# Patient Record
Sex: FEMALE | Race: WHITE | Employment: FULL TIME | URBAN - METROPOLITAN AREA
[De-identification: names, ages, dates, MRNs, and addresses within clinical notes are randomized per-mention and may not be internally consistent; named-entity substitution may affect disease eponyms.]

---

## 2019-01-15 ENCOUNTER — OFFICE VISIT (OUTPATIENT)
Dept: FAMILY MEDICINE CLINIC | Age: 39
End: 2019-01-15

## 2019-01-15 VITALS
WEIGHT: 167 LBS | HEART RATE: 84 BPM | DIASTOLIC BLOOD PRESSURE: 88 MMHG | BODY MASS INDEX: 26.21 KG/M2 | SYSTOLIC BLOOD PRESSURE: 126 MMHG | RESPIRATION RATE: 16 BRPM | TEMPERATURE: 97.8 F | OXYGEN SATURATION: 98 % | HEIGHT: 67 IN

## 2019-01-15 DIAGNOSIS — L98.9 SKIN LESION: Primary | ICD-10-CM

## 2019-01-15 NOTE — PROGRESS NOTES
Chief Complaint Patient presents with [facility] New pateint  Skin Problem Left side of face scab, will like that checked out. 1. Have you been to the ER, urgent care clinic since your last visit? Hospitalized since your last visit? No 
 
2. Have you seen or consulted any other health care providers outside of the [address] [place] [address] since your last visit? Include any pap smears or colon screening.  No

## 2021-10-08 ENCOUNTER — VIRTUAL VISIT (OUTPATIENT)
Dept: INTERNAL MEDICINE CLINIC | Age: 41
End: 2021-10-08
Payer: COMMERCIAL

## 2021-10-08 DIAGNOSIS — Z00.00 ANNUAL PHYSICAL EXAM: ICD-10-CM

## 2021-10-08 DIAGNOSIS — R23.8 CHANGE OF SKIN COLOR: Primary | ICD-10-CM

## 2021-10-08 DIAGNOSIS — F17.200 SMOKING: ICD-10-CM

## 2021-10-08 PROCEDURE — 99203 OFFICE O/P NEW LOW 30 MIN: CPT | Performed by: PHYSICIAN ASSISTANT

## 2021-10-08 RX ORDER — VARENICLINE TARTRATE 1 MG/1
1 TABLET, FILM COATED ORAL 2 TIMES DAILY
Qty: 90 TABLET | Refills: 1 | Status: SHIPPED | OUTPATIENT
Start: 2021-10-08 | End: 2021-10-29

## 2021-10-08 RX ORDER — CHOLECALCIFEROL (VITAMIN D3) 125 MCG
CAPSULE ORAL
COMMUNITY
End: 2021-10-29

## 2021-10-08 NOTE — PROGRESS NOTES
Brennon Santa is a 37 y.o. female who was seen by synchronous (real-time) audio-video technology on 10/8/2021    Consent: Brennon Santa, who was seen by synchronous (real-time) audio-video technology, and/or her healthcare decision maker, is aware that this patient-initiated, Telehealth encounter on 10/8/2021 is a billable service, with coverage as determined by her insurance carrier. She is aware that she may receive a bill and has provided verbal consent to proceed: YES  Subjective:   Brennon Santa is a 37 y.o. female who was seen for [facility] 9 smoking with Chantix years ago, but recently restarted smoking. Would like to restart Chantix. Tried patches - not helping. Face with white patches x years - getting worse. Would like to see Derm. Difficulty sleeping - friend's Ambien has been helpful in the past, but would rather not take this. Nyquil and Benadryl 50 mg cause sleepiness the next day. Benadryl 25 mg is not quite adequate. Walk for exercise a few times/week for over 1 hour. Yoga/massage training. Feels that she is drinking alcohol too frequently and plans to cut back. Successfully did this with quitting smoking years ago. Health Maintenance Due   Topic Date Due    Hepatitis C Screening  Never done    COVID-19 Vaccine (1) Never done    Cervical cancer screen  Never done    Lipid Screen  Never done    Flu Vaccine (1) Never done       Review of Systems   Respiratory: Negative for shortness of breath. Cardiovascular: Negative for chest pain and palpitations. Neurological: Negative for dizziness, loss of consciousness and weakness.      Objective:     General: alert, cooperative, no distress   Mental  status: normal mood, behavior, speech, dress, motor activity, and thought processes, able to follow commands   HENT: NCAT   Neck: no visualized mass   Resp: no respiratory distress   Neuro: no gross deficits   Skin: no discoloration or lesions of concern on visible areas   Psychiatric: normal affect, consistent with stated mood, no evidence of hallucinations     Assessment & Plan:     Encounter Diagnoses     ICD-10-CM ICD-9-CM   1. Change of skin color  R23.8 782.9   2. Annual physical exam  Z00.00 V70.0   3. Smoking  F17.200 305.1     Orders Placed This Encounter    CBC W/O DIFF    METABOLIC PANEL, COMPREHENSIVE    TSH 3RD GENERATION    HEMOGLOBIN A1C WITH EAG    LIPID PANEL    HEPATITIS C AB    REFERRAL TO DERMATOLOGY    varenicline (CHANTIX) 1 mg tablet    melatonin 15 mg tablets nightly     Encouraged pt to increase exercise, decrease alcohol. We discussed the expected course, resolution and complications of the diagnosis(es) in detail. Medication risks, benefits, costs, interactions, and alternatives were discussed as indicated. I advised her to contact the office if her condition worsens, changes or fails to improve as anticipated. She expressed understanding with the diagnosis(es) and plan. Rajesh Velasquez is a 37 y.o. female who was evaluated by a video visit encounter for concerns as above. Patient identification was verified prior to start of the visit. A caregiver was present when appropriate. Due to this being a TeleHealth encounter (During [facility]- public health emergency), evaluation of the following organ systems was limited: Vitals/Constitutional/EENT/Resp/CV/GI//MS/Neuro/Skin/Heme-Lymph-Imm. Pursuant to the emergency declaration under the [facility], 1135 waiver authority and the [employer] General Act, this Virtual  Visit was conducted, with patient's (and/or legal guardian's) consent, to reduce the patient's risk of exposure to COVID-19 and provide necessary medical care. Services were provided through a video synchronous discussion virtually to substitute for in-person clinic visit.    Patient and provider were located at their individual [facility].       Song Paniagua PA-C

## 2021-10-08 NOTE — PROGRESS NOTES
1. Have you been to the ER, urgent care clinic since your last visit? Hospitalized since your last visit? No    2. Have you seen or consulted any other health care providers outside of the [address] [place] [address] since your last visit? Include any pap smears or colon screening.  No   Chief Complaint   Patient presents with   [facility]     Please send link [phone]

## 2021-10-20 ENCOUNTER — OFFICE VISIT (OUTPATIENT)
Dept: INTERNAL MEDICINE CLINIC | Age: 41
End: 2021-10-20
Payer: COMMERCIAL

## 2021-10-20 ENCOUNTER — HOSPITAL ENCOUNTER (OUTPATIENT)
Dept: GENERAL RADIOLOGY | Age: 41
Discharge: HOME | End: 2021-10-20
Attending: PHYSICIAN ASSISTANT
Payer: COMMERCIAL

## 2021-10-20 VITALS
BODY MASS INDEX: 25.3 KG/M2 | HEART RATE: 92 BPM | RESPIRATION RATE: 16 BRPM | TEMPERATURE: 98.3 F | WEIGHT: 161.2 LBS | OXYGEN SATURATION: 100 % | HEIGHT: 67 IN | SYSTOLIC BLOOD PRESSURE: 118 MMHG | DIASTOLIC BLOOD PRESSURE: 79 MMHG

## 2021-10-20 DIAGNOSIS — D64.9 ANEMIA, UNSPECIFIED TYPE: ICD-10-CM

## 2021-10-20 DIAGNOSIS — S99.911A INJURY OF RIGHT ANKLE, INITIAL ENCOUNTER: ICD-10-CM

## 2021-10-20 DIAGNOSIS — Z13.21 ENCOUNTER FOR VITAMIN DEFICIENCY SCREENING: ICD-10-CM

## 2021-10-20 DIAGNOSIS — S99.911A INJURY OF RIGHT ANKLE, INITIAL ENCOUNTER: Primary | ICD-10-CM

## 2021-10-20 PROCEDURE — 73630 X-RAY EXAM OF FOOT: CPT

## 2021-10-20 PROCEDURE — 99214 OFFICE O/P EST MOD 30 MIN: CPT | Performed by: PHYSICIAN ASSISTANT

## 2021-10-20 PROCEDURE — 73610 X-RAY EXAM OF ANKLE: CPT

## 2021-10-20 NOTE — PROGRESS NOTES
1. Have you been to the ER, urgent care clinic since your last visit? Hospitalized since your last visit? No    2. Have you seen or consulted any other health care providers outside of the [address] [place] [address] since your last visit? Include any pap smears or colon screening.  No   Chief Complaint   Patient presents with    Follow-up     labs    Ankle Pain     to right ankle pain is 4 out 10 when walking

## 2021-10-20 NOTE — PROGRESS NOTES
Hallie Chavez is a 37 y.o. female  Chief Complaint   Patient presents with    Follow-up     labs    Ankle Pain     to right ankle pain is 4 out 10 when walking     Visit Vitals  /79   Pulse 92   Temp 98.3 °F (36.8 °C) (Temporal)   Resp 16   Ht 5' 7\" (1.702 m)   Wt 161 lb 3.2 oz (73.1 kg)   SpO2 100%   BMI 25.25 kg/m²      Health Maintenance Due   Topic Date Due    Pneumococcal 0-64 years (1 of 2 - PPSV23) Never done    COVID-19 Vaccine (1) Never done    Cervical cancer screen  Never done    Flu Vaccine (1) Never done       HPI  R Ankle pain x several days after injury. Pain is slowly improving. Anemia x years. Now eating more meat. Not having heavy periods or losing blood through GI/ system. ROS  Review of Systems   Constitutional: Negative for fever. Respiratory: Negative for shortness of breath. Cardiovascular: Negative for chest pain and palpitations. Neurological: Negative for dizziness, loss of consciousness and weakness. Psychiatric/Behavioral: Negative for depression and substance abuse. EXAM  Physical Exam  Vitals and nursing note reviewed. Constitutional:       General: She is not in acute distress. Appearance: She is well-developed. HENT:      Head: Normocephalic and atraumatic. Neck:      Vascular: No JVD. Cardiovascular:      Rate and Rhythm: Normal rate and regular rhythm. Heart sounds: Normal heart sounds. Pulmonary:      Effort: Pulmonary effort is normal. No respiratory distress. Breath sounds: Normal breath sounds. Musculoskeletal:         General: Normal range of motion. Cervical back: Neck supple. Comments: R lateral ankle slightly tender to palp, swollen, and bruised. Bruise extends through toes. Skin:     General: Skin is warm and dry. Neurological:      Mental Status: She is alert and oriented to person, place, and time.    Psychiatric:         Mood and Affect: Mood normal.         Behavior: Behavior normal. Thought Content: Thought content normal.         Judgment: Judgment normal.       ASSESSMENT/PLAN  Encounter Diagnoses     ICD-10-CM ICD-9-CM   1. Injury of right ankle, initial encounter  S99.911A 959.7   2. Anemia, unspecified type  D64.9 285.9   3.  Encounter for vitamin deficiency screening  Z13.21 V77.99     Orders Placed This Encounter    XR ANKLE RT MIN 3 V    XR FOOT RT MIN 3 V    CELIAC ANTIBODY PROFILE    IRON PROFILE    VITAMIN B12 & FOLATE    VITAMIN D, 25 HYDROXY    REFERRAL TO GASTROENTEROLOGY

## 2021-10-25 ENCOUNTER — PATIENT MESSAGE (OUTPATIENT)
Dept: INTERNAL MEDICINE CLINIC | Age: 41
End: 2021-10-25

## 2021-10-25 DIAGNOSIS — E55.9 VITAMIN D DEFICIENCY: ICD-10-CM

## 2021-10-25 DIAGNOSIS — K90.0 CELIAC DISEASE: ICD-10-CM

## 2021-10-25 DIAGNOSIS — D50.9 IRON DEFICIENCY ANEMIA, UNSPECIFIED IRON DEFICIENCY ANEMIA TYPE: Primary | ICD-10-CM

## 2021-10-25 RX ORDER — ACETAMINOPHEN 500 MG
2000 TABLET ORAL DAILY
COMMUNITY
Start: 2021-10-25 | End: 2022-01-21 | Stop reason: DRUGHIGH

## 2021-10-25 NOTE — TELEPHONE ENCOUNTER
----- Message from Chandler Valencia PA-C sent at 10/25/2021 11:58 AM EDT -----  Regarding: FW: Test Results Question  Contact: [phone]  Ok to schedule VV   ----- Message -----  From: Sagrario Donato LPN  Sent: 00/22/6322  10:21 AM EDT  To: Chandler Valencia PA-C  Subject: FW: Test Results Question                          ----- Message -----  From: Dean Guzmán  Sent: 10/25/2021  10:11 AM EDT  To: Claribel Cook Pool  Subject: Test Results Question                            Good morning Bernadette,     Should I schedule a virtual visit with you to discuss the celiac serology results? Some initial thoughts:   1. Im not sure I want to pursue GI appt/ scheduling an endoscopy bc I dont want to keep eating gluten for the likely months long period it will take to schedule endoscopy. I may see how gluten free affects me and go from there. I have had digestive issues and fatigue since I was a child and dont want to prolong feeling like crap if unnecessary. 2. I may want dietician referral at this point bc this is new territory for me. 3. A bone density scan may be helpful at this time bc I have broken 2 bones in last 5 months. Thank you.

## 2021-12-08 ENCOUNTER — OFFICE VISIT (OUTPATIENT)
Dept: ORTHOPEDIC SURGERY | Age: 41
End: 2021-12-08
Payer: COMMERCIAL

## 2021-12-08 VITALS — BODY MASS INDEX: 26.66 KG/M2 | HEIGHT: 65 IN | WEIGHT: 160 LBS

## 2021-12-08 DIAGNOSIS — S82.61XD CLOSED AVULSION FRACTURE OF LATERAL MALLEOLUS OF RIGHT FIBULA WITH ROUTINE HEALING, SUBSEQUENT ENCOUNTER: ICD-10-CM

## 2021-12-08 DIAGNOSIS — S92.351D CLOSED DISPLACED FRACTURE OF FIFTH METATARSAL BONE OF RIGHT FOOT WITH ROUTINE HEALING, SUBSEQUENT ENCOUNTER: Primary | ICD-10-CM

## 2021-12-08 PROCEDURE — 99212 OFFICE O/P EST SF 10 MIN: CPT | Performed by: ORTHOPAEDIC SURGERY

## 2021-12-08 NOTE — PROGRESS NOTES
Karen Bonner (: 1980) is a 37 y.o. female, patient,here for evaluation of the following   Chief Complaint   Patient presents with   [address] is in today for a recheck of her right lateral malleolus and right fifth metatarsal base fractures. She is 6 weeks post her last visit. She denies pain today. ASSESSMENT/PLAN:  Below is the assessment and plan developed based on review of pertinent history, physical exam, labs, studies, and medications. 1. Closed displaced fracture of fifth metatarsal bone of right foot with routine healing, subsequent encounter  -     XR FOOT RT MIN 3 V; Future  -     XR ANKLE RT MIN 3 V; Future  -     REFERRAL TO PHYSICAL THERAPY  2. Closed avulsion fracture of lateral malleolus of right fibula with routine healing, subsequent encounter  -     XR FOOT RT MIN 3 V; Future  -     XR ANKLE RT MIN 3 V; Future  -     REFERRAL TO PHYSICAL THERAPY      Overall patient is improved since last seen at right foot and ankle. The right ankle is fully recovered she has no pain there. The right foot still has some symptoms. I did recommend continued use of a rigid soled shoes, she is wearing regular shoes to have more flexibility so therefore may be taking longer to heal.  I did recommend using a semirigid arch support to help. Referral for physical therapy made for regaining normal strength, balance and gait. If she returns because symptoms do not improve at the right foot, will get new x-rays right foot 3 views weightbearing, otherwise the fracture is healing and should have no further problems complete healing. ,Return if symptoms worsen or fail to improve. Allergies   Allergen Reactions    Gluten Other (comments)       Current Outpatient Medications   Medication Sig    MAGNESIUM PO Take  by mouth.  ZINC PO Take  by mouth.  CYANOCOBALAMIN, VITAMIN B-12, PO Take  by mouth.     ALPRAZolam (XANAX) 0.25 mg tablet Take 1 Tablet by mouth nightly as needed for Sleep. Max Daily Amount: 0.25 mg.    ferrous sulfate (SLOW FE) 142 mg (45 mg iron) ER tablet Take  by mouth Daily (before breakfast).  cholecalciferol (VITAMIN D3) (2,000 UNITS /50 MCG) cap capsule Take 1 Capsule by mouth daily. No current facility-administered medications for this visit. No past medical history on file. Past Surgical History:   Procedure Laterality Date    HX HEENT      Both eyes       Family History   Problem Relation Age of Onset   [employer] Cancer Mother         Lung cancer  in        Social History     Socioeconomic History    Marital status: SINGLE     Spouse name: Not on file    Number of children: Not on file    Years of education: Not on file    Highest education level: Not on file   Occupational History    Occupation:  [employer]   Tobacco Use    Smoking status: Current Every Day Smoker     Packs/day: 1.00     Years: 13.00     Pack years: 13.00    Smokeless tobacco: Never Used   Vaping Use    Vaping Use: Former    Substances: Nicotine    Devices: Refillable tank   Substance and Sexual Activity    Alcohol use: Yes     Alcohol/week: 20.0 standard drinks     Types: 20 Standard drinks or equivalent per week    Drug use: No    Sexual activity: Yes     Partners: Male     Birth control/protection: None   Other Topics Concern    Not on file   Social History Narrative    Not on file     Social Determinants of Health     Financial Resource Strain:     Difficulty of Paying Living Expenses: Not on file   Food Insecurity:     Worried About Running Out of Food in the Last Year: Not on file    Kapil of Food in the Last Year: Not on file   Transportation Needs:     Lack of Transportation (Medical): Not on file    Lack of Transportation (Non-Medical):  Not on file   Physical Activity:     Days of Exercise per Week: Not on file    Minutes of Exercise per Session: Not on file   Stress:     Feeling of Stress : Not on file   Social Connections:     Frequency of Communication with Friends and Family: Not on file    Frequency of Social Gatherings with Friends and Family: Not on file    Attends [employer] Services: Not on file    Active Member of Clubs or Organizations: Not on file    Attends Club or Organization Meetings: Not on file    Marital Status: Not on file   Intimate Partner Violence:     Fear of Current or Ex-Partner: Not on file    Emotionally Abused: Not on file    Physically Abused: Not on file    Sexually Abused: Not on file   Housing Stability:     Unable to Pay for Housing in the Last Year: Not on file    Number of [place] in the Last Year: Not on file    Unstable Housing in the Last Year: Not on file           Vitals:  Ht 5' 5\" (1.651 m)   Wt 160 lb (72.6 kg)   BMI 26.63 kg/m²    Body mass index is 26.63 kg/m². SUBJECTIVE/OBJECTIVE:  Toribio Valladares (: 1980)   Patient returns for follow-up regarding the right foot fifth metatarsal shaft fracture and right lateral malleolus avulsion fracture. She has no pain at the ankle at this point. Her symptoms are mostly the right foot. The pain is worse only with walking activities and seems to be getting better over time but not 100%. She states she is having more discomfort towards the forefoot now and not at the where she had the fracture. Otherwise overall doing well. She is not diabetic, non-smoker. Physical Exam  Pleasant well-nourished female , alert and oriented to person, time and place, no acute distress. Mostly normal gait, normal weightbearing stance. Right ankle: No tenderness to palpation, ligament stable, Achilles tendon intact with negative Hilliard test, negative ankle squeeze test.  There is full active and passive range of motion intact, strength 5/5 in all directions.     Right foot: Normal weightbearing stance, there is still a bit of tenderness around the fifth metatarsal only with very deep palpation, there is mild discomfort around the forefoot, no swelling, no ecchymosis, no fluctuance, able to flex and extend all the toes with good range of motion strength 5/5. Contralateral foot and ankle exam, nontender, no swelling ligaments grossly stable. Normal weightbearing stance. Neurovascular exam intact for light touch sensation, cap refill, dorsalis pedis pulse palpable, flexion/extension strength 5/5. Skin intact without open wounds, lesions or ulcers, no skin discolorations, normal warmth to skin. Imaging:    XR Results (maximum last 2): Results from Appointment encounter on 12/08/21    XR ANKLE RT MIN 3 V    Narrative  Right ankle AP, lateral and oblique nonweightbearing x-rays show the avulsion fracture of the lateral malleolus completely healed, there is normal ankle mortise, normal joint space, no other significant findings. No soft tissue swelling. XR FOOT RT MIN 3 V    Narrative  Right foot nonweightbearing AP, lateral and oblique x-rays show the fifth metatarsal alignment remains good, the fifth MTP joint is congruent, the fracture is healing. Fracture is mostly healed. Satisfactory bone density. No lesions. An electronic signature was used to authenticate this note.   -- Sonny Nagy MD

## 2021-12-15 ENCOUNTER — HOSPITAL ENCOUNTER (OUTPATIENT)
Dept: PHYSICAL THERAPY | Age: 41
Discharge: HOME | End: 2021-12-15
Payer: COMMERCIAL

## 2021-12-15 PROCEDURE — 97161 PT EVAL LOW COMPLEX 20 MIN: CPT | Performed by: PHYSICAL THERAPIST

## 2021-12-15 PROCEDURE — 97110 THERAPEUTIC EXERCISES: CPT | Performed by: PHYSICAL THERAPIST

## 2021-12-15 PROCEDURE — 97140 MANUAL THERAPY 1/> REGIONS: CPT | Performed by: PHYSICAL THERAPIST

## 2021-12-15 NOTE — PROGRESS NOTES
PT INITIAL EVALUATION NOTE 2-15    Patient Name: Nisha Stevens  Date:12/15/2021  : 1980  [x]  Patient  Verified  Payor: BLUE CROSS / Plan: [employer] [facility] / Product Type: PPO /    In time:  1:15 pm  Out time:  2:10 pm  Total Treatment Time (min): 55  Visit #: 1     Treatment Area: Pain in right ankle and joints of right foot [M25.571]    SUBJECTIVE  Pain Level (0-10 scale): 1  Any medication changes, allergies to medications, adverse drug reactions, diagnosis change, or new procedure performed?: [] No    [x] Yes (see summary sheet for update)  Subjective:     10/16/2021 Stepped off a ladder rolled her right ankle, fractured her 5th metatarsal and avulsion of lateral malleolus. Put in a boot for 6 weeks, felt like her gait was off in the boot. Started wearing running shoes, but not sure if they are as supportive as they should be. She followed up with Dr. Milagro Boyce who recommended orthotics. She complains of pain in her right lateral foot if she's barefoot and steps on that spot. With prolonged walking, she has soreness across her dorsal toes. She feels as though her right 5th toe is not flexing like the other side. She would like to increase her activity level, but feels prone to recurrent injury. \"It feels like it wants to roll again. \"  She prefers to be barefoot vs wearing supportive shoes, to be able to get the feedback from the ground. Her goal is to get back to prolonged walking without foot pain. OBJECTIVE/EXAMINATION  Posture:  B foot pronation in standing. Ankle    AROM          R  L    Dorsi Flexion:   +5  +5      Plantar Flexion:  50  50     Inversion:   40  45      Eversion:   20  18        STRENGTH:   Not manually muscle tested. There is active contraction of her right 5th toe flexor. Palpation:  Tenderness right 5th metatarsal, lateral malleolus.     15 min Therapeutic Exercise:  [x] See flow sheet :   Rationale: increase strength to improve the patients ability to walk without foot pain. 15 min Manual Therapy:    Metatarsal mobs  Anterior and posterior glides 5th MTP and IP   Rationale: decrease pain, increase ROM and increase tissue extensibility  to improve the patients ability to walk without foot pain.           With   [x] TE   [] TA   [] Neuro   [] SC   [] other: Patient Education: [x] Review HEP    [] Progressed/Changed HEP based on:   [] positioning   [] body mechanics   [] transfers   [] heat/ice application    [] other:      Other Objective/Functional Measures:     Pain Level (0-10 scale) post treatment: 1      ASSESSMENT:      [x]  See Plan of [facility] [employer] V, PT 12/15/2021

## 2021-12-16 NOTE — PROGRESS NOTES
Physical Therapy at [facility],   a part of [address], [facility]  Phone: [phone]  Fax: [phone]    Plan of Care/Statement of Necessity for Physical Therapy Services  2-15    Patient name: Shari Jiménez  : 1980  Provider#: [id number]  Referral source: Wilfrid Paige MD      Medical/Treatment Diagnosis: Pain in right ankle and joints of right foot [M25.571]     Prior Hospitalization: see medical history     Comorbidities: None  Prior Level of Function: Able to squat, walk without foot pain. Medications: Verified on Patient Summary List    Start of Care: 12/15/2021      Onset Date: 10/16/2021       The Plan of Care and following information is based on the information from the initial evaluation. Assessment/ key information: This patient presents with right foot and lateral ankle pain, tenderness, decreased ROM, and impaired function.     Evaluation Complexity History LOW Complexity : Zero comorbidities / personal factors that will impact the outcome / POC; Examination LOW Complexity : 1-2 Standardized tests and measures addressing body structure, function, activity limitation and / or participation in recreation  ;Presentation LOW Complexity : Stable, uncomplicated  ;Clinical Decision Making MEDIUM Complexity : FOTO score of 26-74  Overall Complexity Rating: LOW     Problem List: pain affecting function, decrease ROM, decrease strength, impaired gait/ balance, decrease ADL/ functional abilitiies, decrease activity tolerance and decrease flexibility/ joint mobility   Treatment Plan may include any combination of the following: Therapeutic exercise, Therapeutic activities, Neuromuscular re-education, Physical agent/modality, Gait/balance training, Manual therapy, Patient education, Self Care training and Functional mobility training  Patient / Family readiness to learn indicated by: asking questions, trying to perform skills and interest  Persons(s) to be included in education: patient (P)  Barriers to Learning/Limitations: None  Patient Goal (s): Increase range of motion and strength.   Patient Self Reported Health Status: good  Rehabilitation Potential: good    Short Term Goals: To be accomplished in 6 treatments:  1. Increase right ankle inversion to 45 degrees to tolerate position changes while walking on uneven ground. 2.  Independent HEP to increase strength needed for prolonged walking. 3.  Able to walk for 1 hour without foot pain. Frequency / Duration: Patient to be seen 1 times per week for 6 treatments. Patient/ Caregiver education and instruction: exercises    [x]  Plan of care has been reviewed with SUZAN Bryant, PT 12/15/2021   ________________________________________________________________________    I certify that the above Therapy Services are being furnished while the patient is under my care. I agree with the treatment plan and certify that this therapy is necessary.     Physician's Signature:____________________  Date:____________Time: _________      Odin Bergeron MD

## 2022-01-13 ENCOUNTER — PATIENT MESSAGE (OUTPATIENT)
Dept: INTERNAL MEDICINE CLINIC | Age: 42
End: 2022-01-13

## 2022-01-13 DIAGNOSIS — K90.0 CELIAC DISEASE: Primary | ICD-10-CM

## 2022-01-14 NOTE — TELEPHONE ENCOUNTER
From: Lulú Briceño  To: Amish George PA-C  Sent: 1/13/2022 3:41 PM EST  Subject: Lab orders    Hi Unknown Jeremy been about 3 months since celiac diagnosis/gf diet. Could you pls order celiac panel, vitamin D, calcium, iron, and mineral panel? Also, I would like to have any labs that may be able to confirm hypothyroidism.    Thank you,   Hazel Jimenez

## 2022-01-20 ENCOUNTER — HOSPITAL ENCOUNTER (OUTPATIENT)
Dept: MAMMOGRAPHY | Age: 42
Discharge: HOME | End: 2022-01-20
Attending: PHYSICIAN ASSISTANT
Payer: COMMERCIAL

## 2022-01-20 DIAGNOSIS — K90.0 CELIAC DISEASE: ICD-10-CM

## 2022-01-20 DIAGNOSIS — Z87.81 HISTORY OF CLOSED FRACTURE: ICD-10-CM

## 2022-01-20 PROCEDURE — 77080 DXA BONE DENSITY AXIAL: CPT

## 2022-01-21 DIAGNOSIS — E55.9 VITAMIN D DEFICIENCY: Primary | ICD-10-CM

## 2022-01-21 DIAGNOSIS — M85.89 OSTEOPENIA OF MULTIPLE SITES: ICD-10-CM

## 2022-01-21 RX ORDER — CHOLECALCIFEROL (VITAMIN D3) 125 MCG
5000 CAPSULE ORAL DAILY
Qty: 90 CAPSULE | Refills: 3 | Status: SHIPPED | OUTPATIENT
Start: 2022-01-21

## 2022-01-21 NOTE — PROGRESS NOTES
Your bone density scan shows that you have Osteopenia (\"pre-osteoporosis\") and it is close to the Osteoporosis range. Please increase OTC Vitamin D3 to 5000 units once daily, OTC Calcium Citrate 500 mg twice daily, and increase weight bearing exercise. Recheck DEXA scan in 2 years.

## 2022-03-18 PROBLEM — M85.89 OSTEOPENIA OF MULTIPLE SITES: Status: ACTIVE | Noted: 2022-01-21

## 2022-03-18 PROBLEM — D50.9 IRON DEFICIENCY ANEMIA, UNSPECIFIED IRON DEFICIENCY ANEMIA TYPE: Status: ACTIVE | Noted: 2021-10-25

## 2022-03-20 PROBLEM — E55.9 VITAMIN D DEFICIENCY: Status: ACTIVE | Noted: 2021-10-25

## 2022-03-20 PROBLEM — K90.0 CELIAC DISEASE: Status: ACTIVE | Noted: 2021-10-25

## 2022-04-01 ENCOUNTER — TELEPHONE (OUTPATIENT)
Dept: INTERNAL MEDICINE CLINIC | Age: 42
End: 2022-04-01

## 2022-04-01 NOTE — TELEPHONE ENCOUNTER
Reason for referral request? Endocrinologist  Dr. Radha Laura works at [facility].

## 2022-04-01 NOTE — TELEPHONE ENCOUNTER
I didn't write a referral. Why does pt want to go? She doesn't have thyroid problems. Maybe she could go for Osteopenia?

## 2022-04-01 NOTE — TELEPHONE ENCOUNTER
----- Message from [place] sent at 4/1/2022 10:03 AM EDT -----  Subject: Referral Request    QUESTIONS   Reason for referral request? Endocrinologist   Has the physician seen you for this condition before? No   Preferred Specialist (if applicable)? Shireen Noguera  Do you already have an appointment scheduled? No  Additional Information for Provider? Dr. Yelena Patel works at [facility]. ---------------------------------------------------------------------------  --------------  Eliza REED  What is the best way for the office to contact you? OK to leave message on   voicemail  Preferred Call Back Phone Number? [phone]  ---------------------------------------------------------------------------  --------------  SCRIPT ANSWERS  Relationship to Patient?  Self

## 2022-09-22 DIAGNOSIS — F17.200 SMOKING: ICD-10-CM

## 2022-09-22 RX ORDER — VARENICLINE TARTRATE 1 MG/1
1 TABLET, FILM COATED ORAL 2 TIMES DAILY
Qty: 90 TABLET | Refills: 1 | Status: SHIPPED | OUTPATIENT
Start: 2022-09-22 | End: 2022-12-21

## 2022-10-12 ENCOUNTER — OFFICE VISIT (OUTPATIENT)
Dept: INTERNAL MEDICINE CLINIC | Age: 42
End: 2022-10-12
Payer: COMMERCIAL

## 2022-10-12 VITALS
WEIGHT: 171 LBS | OXYGEN SATURATION: 98 % | BODY MASS INDEX: 28.49 KG/M2 | HEART RATE: 80 BPM | HEIGHT: 65 IN | RESPIRATION RATE: 19 BRPM | DIASTOLIC BLOOD PRESSURE: 79 MMHG | SYSTOLIC BLOOD PRESSURE: 115 MMHG | TEMPERATURE: 98 F

## 2022-10-12 DIAGNOSIS — D50.9 IRON DEFICIENCY ANEMIA, UNSPECIFIED IRON DEFICIENCY ANEMIA TYPE: ICD-10-CM

## 2022-10-12 DIAGNOSIS — F51.04 PSYCHOPHYSIOLOGICAL INSOMNIA: ICD-10-CM

## 2022-10-12 DIAGNOSIS — K90.0 CELIAC DISEASE: ICD-10-CM

## 2022-10-12 DIAGNOSIS — R10.84 GENERALIZED ABDOMINAL PAIN: Primary | ICD-10-CM

## 2022-10-12 DIAGNOSIS — E55.9 VITAMIN D DEFICIENCY: ICD-10-CM

## 2022-10-12 DIAGNOSIS — R10.84 GENERALIZED ABDOMINAL PAIN: ICD-10-CM

## 2022-10-12 PROCEDURE — 99214 OFFICE O/P EST MOD 30 MIN: CPT | Performed by: PHYSICIAN ASSISTANT

## 2022-10-12 RX ORDER — ALPRAZOLAM 0.25 MG/1
0.25 TABLET ORAL
Qty: 30 TABLET | Refills: 2 | Status: SHIPPED | OUTPATIENT
Start: 2022-10-12

## 2022-10-12 NOTE — PROGRESS NOTES
Jacque Braxton is a 40 y.o. female  Chief Complaint   Patient presents with    Sleep Problem    Nicotine Dependence     Visit Vitals  /79 (BP 1 Location: Left upper arm, BP Patient Position: Sitting, BP Cuff Size: Adult)   Pulse 80   Temp 98 °F (36.7 °C) (Temporal)   Resp 19   Ht 5' 5\" (1.651 m)   Wt 171 lb (77.6 kg)   SpO2 98%   BMI 28.46 kg/m²      Health Maintenance Due   Topic Date Due    COVID-19 Vaccine (1) Never done    Cervical cancer screen  Never done    Flu Vaccine (1) Never done       HPI  Hx Celiac Disease, Iron Def anemia. Saw naturopath & GI and reports that levels normalized with Gluten Free diet. Will send me labs. Reviewed on pt's phone, and CBC, CMP, Thyroid, Vit D all WNL. 3x in last 6 weeks: stomach pains, diarrhea even after eating gluten free food. Pain was severe and \"entire abdomen\" - vomited 3 times and had diarrhea once with each episode. Insomnia - \"cured\" April-July, but struggling off an on since then. Now taking Xanax 1-2x/week. ROS  Review of Systems   Respiratory:  Negative for shortness of breath. Cardiovascular:  Negative for chest pain and palpitations. Gastrointestinal:  Positive for abdominal pain, constipation, diarrhea, nausea and vomiting. Negative for blood in stool and melena. Neurological:  Negative for dizziness, loss of consciousness and weakness. EXAM  Physical Exam  Vitals and nursing note reviewed. Constitutional:       General: She is not in acute distress. Appearance: Normal appearance. She is well-developed. She is not ill-appearing or diaphoretic. HENT:      Head: Normocephalic and atraumatic. Neck:      Vascular: No JVD. Cardiovascular:      Rate and Rhythm: Normal rate and regular rhythm. Heart sounds: Normal heart sounds. Pulmonary:      Effort: Pulmonary effort is normal. No respiratory distress. Breath sounds: Normal breath sounds.    Abdominal:      General: Bowel sounds are normal. There is no distension. Palpations: Abdomen is soft. There is no mass. Tenderness: There is no abdominal tenderness. There is no guarding or rebound. Hernia: No hernia is present. Musculoskeletal:      Cervical back: Neck supple. Right lower leg: No edema. Left lower leg: No edema. Skin:     General: Skin is warm and dry. Neurological:      Mental Status: She is alert and oriented to person, place, and time. Psychiatric:         Mood and Affect: Mood normal.         Behavior: Behavior normal.         Thought Content: Thought content normal.         Judgment: Judgment normal.     ASSESSMENT/PLAN  Encounter Diagnoses     ICD-10-CM ICD-9-CM   1. Generalized abdominal pain  R10.84 789.07   2. Psychophysiological insomnia  F51.04 307.42   3. Iron deficiency anemia, unspecified iron deficiency anemia type  D50.9 280.9   4. Celiac disease  K90.0 579.0   5. Vitamin D deficiency  E55.9 268.9     Orders Placed This Encounter    US ABD LTD    AMYLASE    LIPASE    Refill ALPRAZolam (XANAX) 0.25 mg tablet   Pt will send me her previously done labs.

## 2022-10-12 NOTE — PROGRESS NOTES
Room: 1  Identified pt with two pt identifiers(name and ). Reviewed record in preparation for visit and have obtained necessary documentation. Chief Complaint   Patient presents with    Sleep Problem    Nicotine Dependence        Vitals:    10/12/22 1524   BP: 115/79   Pulse: 80   Resp: 19   Temp: 98 °F (36.7 °C)   TempSrc: Temporal   SpO2: 98%   Weight: 171 lb (77.6 kg)   Height: 5' 5\" (1.651 m)   PainSc:   0 - No pain   LMP: 2022       Health Maintenance Due   Topic    COVID-19 Vaccine (1)    Cervical cancer screen     Flu Vaccine (1)       1. \"Have you been to the ER, urgent care clinic since your last visit? Hospitalized since your last visit? \" No    2. \"Have you seen or consulted any other health care providers outside of the [address] [place] [address] since your last visit? \" No     3. For patients over 45: Has the patient had a colonoscopy? NA - based on age     If the patient is female:    4. For patients over 36: Has the patient had a mammogram? NA - based on age    11. For patients over 21: Has the patient had a pap smear? No    Current Outpatient Medications   Medication Instructions    ALPRAZolam (XANAX) 0.25 mg, Oral, BEDTIME PRN    calcium citrate-vitamin D3 500 mg-12.5 mcg /5 gram powd Oral    cholecalciferol (VITAMIN D3) 5,000 Units, Oral, DAILY, Take one capsule by mouth once daily. CYANOCOBALAMIN, VITAMIN B-12, PO Oral    ferrous sulfate (SLOW FE) 142 mg (45 mg iron) ER tablet Oral, DAILY BEFORE BREAKFAST    MAGNESIUM PO Oral    varenicline (CHANTIX) 1 mg, Oral, 2 TIMES DAILY    ZINC PO Oral       Allergies   Allergen Reactions    Gluten Other (comments)       Immunization History   Administered Date(s) Administered    HPV (Quad) 2007    Tdap 2007, 2014, 10/02/2014       No past medical history on file.

## 2022-10-13 LAB
AMYLASE SERPL-CCNC: 63 U/L (ref 25–115)
LIPASE SERPL-CCNC: 233 U/L (ref 73–393)

## 2023-05-21 RX ORDER — ALPRAZOLAM 0.25 MG/1
0.25 TABLET ORAL
COMMUNITY
Start: 2022-10-12

## 2024-12-11 ENCOUNTER — APPOINTMENT (OUTPATIENT)
Facility: HOSPITAL | Age: 44
End: 2024-12-11
Payer: COMMERCIAL

## 2024-12-11 ENCOUNTER — HOSPITAL ENCOUNTER (EMERGENCY)
Facility: HOSPITAL | Age: 44
Discharge: HOME | End: 2024-12-11
Attending: EMERGENCY MEDICINE
Payer: COMMERCIAL

## 2024-12-11 VITALS
TEMPERATURE: 97.6 F | RESPIRATION RATE: 18 BRPM | SYSTOLIC BLOOD PRESSURE: 112 MMHG | HEART RATE: 91 BPM | OXYGEN SATURATION: 99 % | DIASTOLIC BLOOD PRESSURE: 74 MMHG | BODY MASS INDEX: 22.78 KG/M2 | WEIGHT: 136.91 LBS

## 2024-12-11 DIAGNOSIS — R10.11 RIGHT UPPER QUADRANT ABDOMINAL PAIN: Primary | ICD-10-CM

## 2024-12-11 DIAGNOSIS — R79.89 ABNORMAL LFTS: ICD-10-CM

## 2024-12-11 LAB
ALBUMIN SERPL-MCNC: 3.7 G/DL (ref 3.5–5)
ALBUMIN/GLOB SERPL: 1.3 (ref 1.1–2.2)
ALP SERPL-CCNC: 58 U/L (ref 45–117)
ALT SERPL-CCNC: 129 U/L (ref 12–78)
ANION GAP SERPL CALC-SCNC: 6 MMOL/L (ref 2–12)
APPEARANCE UR: ABNORMAL
AST SERPL-CCNC: 269 U/L (ref 15–37)
BACTERIA URNS QL MICRO: NEGATIVE /HPF
BASOPHILS # BLD: 0.1 K/UL (ref 0–0.1)
BASOPHILS NFR BLD: 1 % (ref 0–1)
BILIRUB SERPL-MCNC: 1 MG/DL (ref 0.2–1)
BILIRUB UR QL: NEGATIVE
BUN SERPL-MCNC: 15 MG/DL (ref 6–20)
BUN/CREAT SERPL: 21 (ref 12–20)
CALCIUM SERPL-MCNC: 8.6 MG/DL (ref 8.5–10.1)
CHLORIDE SERPL-SCNC: 105 MMOL/L (ref 97–108)
CO2 SERPL-SCNC: 25 MMOL/L (ref 21–32)
COLOR UR: ABNORMAL
COMMENT:: NORMAL
CREAT SERPL-MCNC: 0.71 MG/DL (ref 0.55–1.02)
DIFFERENTIAL METHOD BLD: ABNORMAL
EKG ATRIAL RATE: 88 BPM
EKG DIAGNOSIS: NORMAL
EKG P AXIS: 87 DEGREES
EKG P-R INTERVAL: 130 MS
EKG Q-T INTERVAL: 380 MS
EKG QRS DURATION: 84 MS
EKG QTC CALCULATION (BAZETT): 459 MS
EKG R AXIS: 66 DEGREES
EKG T AXIS: 68 DEGREES
EKG VENTRICULAR RATE: 88 BPM
EOSINOPHIL # BLD: 0.2 K/UL (ref 0–0.4)
EOSINOPHIL NFR BLD: 2 % (ref 0–7)
EPITH CASTS URNS QL MICRO: ABNORMAL /LPF
ERYTHROCYTE [DISTWIDTH] IN BLOOD BY AUTOMATED COUNT: 12.5 % (ref 11.5–14.5)
ETHANOL SERPL-MCNC: <10 MG/DL (ref 0–0.08)
GLOBULIN SER CALC-MCNC: 2.9 G/DL (ref 2–4)
GLUCOSE SERPL-MCNC: 171 MG/DL (ref 65–100)
GLUCOSE UR STRIP.AUTO-MCNC: NEGATIVE MG/DL
HCT VFR BLD AUTO: 36.6 % (ref 35–47)
HGB BLD-MCNC: 12.3 G/DL (ref 11.5–16)
HGB UR QL STRIP: NEGATIVE
HYALINE CASTS URNS QL MICRO: ABNORMAL /LPF (ref 0–5)
IMM GRANULOCYTES # BLD AUTO: 0.1 K/UL (ref 0–0.04)
IMM GRANULOCYTES NFR BLD AUTO: 1 % (ref 0–0.5)
KETONES UR QL STRIP.AUTO: ABNORMAL MG/DL
LEUKOCYTE ESTERASE UR QL STRIP.AUTO: NEGATIVE
LIPASE SERPL-CCNC: 47 U/L (ref 13–75)
LYMPHOCYTES # BLD: 0.7 K/UL (ref 0.8–3.5)
LYMPHOCYTES NFR BLD: 6 % (ref 12–49)
MCH RBC QN AUTO: 31.8 PG (ref 26–34)
MCHC RBC AUTO-ENTMCNC: 33.6 G/DL (ref 30–36.5)
MCV RBC AUTO: 94.6 FL (ref 80–99)
MONOCYTES # BLD: 0.4 K/UL (ref 0–1)
MONOCYTES NFR BLD: 3 % (ref 5–13)
NEUTS SEG # BLD: 10.6 K/UL (ref 1.8–8)
NEUTS SEG NFR BLD: 87 % (ref 32–75)
NITRITE UR QL STRIP.AUTO: NEGATIVE
NRBC # BLD: 0 K/UL (ref 0–0.01)
NRBC BLD-RTO: 0 PER 100 WBC
PH UR STRIP: 7 (ref 5–8)
PLATELET # BLD AUTO: 239 K/UL (ref 150–400)
PMV BLD AUTO: 8.8 FL (ref 8.9–12.9)
POTASSIUM SERPL-SCNC: 3 MMOL/L (ref 3.5–5.1)
PROT SERPL-MCNC: 6.6 G/DL (ref 6.4–8.2)
PROT UR STRIP-MCNC: NEGATIVE MG/DL
RBC # BLD AUTO: 3.87 M/UL (ref 3.8–5.2)
RBC #/AREA URNS HPF: ABNORMAL /HPF (ref 0–5)
RBC MORPH BLD: ABNORMAL
SODIUM SERPL-SCNC: 136 MMOL/L (ref 136–145)
SP GR UR REFRACTOMETRY: 1.01
SPECIMEN HOLD: NORMAL
URINE CULTURE IF INDICATED: ABNORMAL
UROBILINOGEN UR QL STRIP.AUTO: 0.2 EU/DL (ref 0.2–1)
WBC # BLD AUTO: 12.1 K/UL (ref 3.6–11)
WBC URNS QL MICRO: ABNORMAL /HPF (ref 0–4)

## 2024-12-11 PROCEDURE — 81001 URINALYSIS AUTO W/SCOPE: CPT

## 2024-12-11 PROCEDURE — 36415 COLL VENOUS BLD VENIPUNCTURE: CPT

## 2024-12-11 PROCEDURE — 74177 CT ABD & PELVIS W/CONTRAST: CPT

## 2024-12-11 PROCEDURE — 83690 ASSAY OF LIPASE: CPT

## 2024-12-11 PROCEDURE — 93005 ELECTROCARDIOGRAM TRACING: CPT | Performed by: EMERGENCY MEDICINE

## 2024-12-11 PROCEDURE — 96375 TX/PRO/DX INJ NEW DRUG ADDON: CPT

## 2024-12-11 PROCEDURE — 6360000004 HC RX CONTRAST MEDICATION: Performed by: EMERGENCY MEDICINE

## 2024-12-11 PROCEDURE — 85025 COMPLETE CBC W/AUTO DIFF WBC: CPT

## 2024-12-11 PROCEDURE — 80053 COMPREHEN METABOLIC PANEL: CPT

## 2024-12-11 PROCEDURE — 96374 THER/PROPH/DIAG INJ IV PUSH: CPT

## 2024-12-11 PROCEDURE — 82077 ASSAY SPEC XCP UR&BREATH IA: CPT

## 2024-12-11 PROCEDURE — 6360000002 HC RX W HCPCS: Performed by: EMERGENCY MEDICINE

## 2024-12-11 PROCEDURE — 93010 ELECTROCARDIOGRAM REPORT: CPT | Performed by: SPECIALIST

## 2024-12-11 PROCEDURE — 99285 EMERGENCY DEPT VISIT HI MDM: CPT

## 2024-12-11 PROCEDURE — 76705 ECHO EXAM OF ABDOMEN: CPT

## 2024-12-11 RX ORDER — IOPAMIDOL 755 MG/ML
100 INJECTION, SOLUTION INTRAVASCULAR
Status: COMPLETED | OUTPATIENT
Start: 2024-12-11 | End: 2024-12-11

## 2024-12-11 RX ORDER — ONDANSETRON 2 MG/ML
4 INJECTION INTRAMUSCULAR; INTRAVENOUS ONCE
Status: COMPLETED | OUTPATIENT
Start: 2024-12-11 | End: 2024-12-11

## 2024-12-11 RX ORDER — MORPHINE SULFATE 4 MG/ML
4 INJECTION, SOLUTION INTRAMUSCULAR; INTRAVENOUS
Status: COMPLETED | OUTPATIENT
Start: 2024-12-11 | End: 2024-12-11

## 2024-12-11 RX ORDER — DICYCLOMINE HCL 20 MG
20 TABLET ORAL EVERY 6 HOURS PRN
Qty: 20 TABLET | Refills: 0 | Status: SHIPPED | OUTPATIENT
Start: 2024-12-11

## 2024-12-11 RX ADMIN — ONDANSETRON 4 MG: 2 INJECTION INTRAMUSCULAR; INTRAVENOUS at 03:05

## 2024-12-11 RX ADMIN — IOPAMIDOL 100 ML: 755 INJECTION, SOLUTION INTRAVENOUS at 03:50

## 2024-12-11 RX ADMIN — MORPHINE SULFATE 4 MG: 4 INJECTION, SOLUTION INTRAMUSCULAR; INTRAVENOUS at 03:31

## 2024-12-11 ASSESSMENT — PAIN DESCRIPTION - FREQUENCY: FREQUENCY: CONTINUOUS

## 2024-12-11 ASSESSMENT — PAIN - FUNCTIONAL ASSESSMENT
PAIN_FUNCTIONAL_ASSESSMENT: 0-10
PAIN_FUNCTIONAL_ASSESSMENT: PREVENTS OR INTERFERES SOME ACTIVE ACTIVITIES AND ADLS
PAIN_FUNCTIONAL_ASSESSMENT: PREVENTS OR INTERFERES SOME ACTIVE ACTIVITIES AND ADLS

## 2024-12-11 ASSESSMENT — PAIN DESCRIPTION - ONSET: ONSET: ON-GOING

## 2024-12-11 ASSESSMENT — PAIN DESCRIPTION - PAIN TYPE: TYPE: ACUTE PAIN

## 2024-12-11 ASSESSMENT — PAIN DESCRIPTION - DIRECTION: RADIATING_TOWARDS: RIGHT SHOULDER

## 2024-12-11 ASSESSMENT — PAIN DESCRIPTION - DESCRIPTORS
DESCRIPTORS: SHARP;SHOOTING
DESCRIPTORS: ACHING

## 2024-12-11 ASSESSMENT — PAIN DESCRIPTION - ORIENTATION
ORIENTATION: RIGHT;UPPER
ORIENTATION: RIGHT;MID;UPPER

## 2024-12-11 ASSESSMENT — PAIN DESCRIPTION - LOCATION
LOCATION: ABDOMEN
LOCATION: ABDOMEN

## 2024-12-11 ASSESSMENT — PAIN SCALES - GENERAL
PAINLEVEL_OUTOF10: 10
PAINLEVEL_OUTOF10: 7

## 2024-12-11 NOTE — ED PROVIDER NOTES
[facility] EMERGENCY DEP  EMERGENCY DEPARTMENT ENCOUNTER      Pt Name: Pamela Clay  MRN: [id number]  Birthdate 1980  Date of evaluation: 2024  Provider: Juan Pablo Alcazar MD      HISTORY OF PRESENT ILLNESS      44-year-old female without any pertinent medical history presents to the emergency department chief complaint of right upper quadrant abdominal pain which woke her from sleep about 2 hours ago.  No abdominal surgeries.  She has some nausea but no vomiting.  No fever.    The history is provided by the patient, medical records and the spouse.           Nursing Notes were reviewed.    REVIEW OF SYSTEMS         Review of Systems        PAST MEDICAL HISTORY   No past medical history on file.      SURGICAL HISTORY       Past Surgical History:   Procedure Laterality Date    ENT      Both eyes         CURRENT MEDICATIONS       Previous Medications    ALPRAZOLAM (XANAX) 0.25 MG TABLET    Take 1 tablet by mouth.    CALCIUM CITRATE-VITAMIN D 500-12.5 MG-MCG PACK    Take by mouth    FERROUS SULFATE (SLOW FE) 142 (45 FE) MG EXTENDED RELEASE TABLET    Take by mouth every morning (before breakfast)    MAGNESIUM PO    Take by mouth    VITAMIN D (CHOLECALCIFEROL) 125 MCG (5000 UT) CAPS CAPSULE    Take 1 capsule by mouth daily    ZINC PO    Take by mouth       ALLERGIES     Gluten    FAMILY HISTORY       Family History   Problem Relation Age of Onset    Cancer Mother         Lung cancer  in           SOCIAL HISTORY       Social History     Socioeconomic History    Marital status: Single   Tobacco Use    Smoking status: Every Day     Current packs/day: 1.00     Types: Cigarettes    Smokeless tobacco: Never   Substance and Sexual Activity    Alcohol use: Yes     Alcohol/week: 6.0 standard drinks of alcohol    Drug use: No         PHYSICAL EXAM       ED Triage Vitals [24 0239]   BP Systolic BP Percentile Diastolic BP Percentile Temp Temp src Pulse Resp SpO2   -- -- -- -- -- -- -- --      Height

## 2024-12-11 NOTE — ED TRIAGE NOTES
Patient arrives POV to ED cc of right upper quadrant pain radiating to right shoulder and down right arm since about 0030 tonight. Patient denies vomiting, endorses nausea and diarrhea. Patient has a hx of celiac disease. No prior abdominal surgeries.

## 2025-01-13 ENCOUNTER — HOSPITAL ENCOUNTER (OUTPATIENT)
Facility: HOSPITAL | Age: 45
Discharge: HOME | End: 2025-01-16
Attending: INTERNAL MEDICINE
Payer: COMMERCIAL

## 2025-01-13 DIAGNOSIS — R17 UNSPECIFIED JAUNDICE: ICD-10-CM

## 2025-01-13 DIAGNOSIS — R19.4 CHANGE IN BOWEL HABIT: ICD-10-CM

## 2025-01-13 DIAGNOSIS — R10.11 RUQ PAIN: ICD-10-CM

## 2025-01-13 DIAGNOSIS — R74.8 ACID PHOSPHATASE ELEVATED: ICD-10-CM

## 2025-01-13 DIAGNOSIS — K90.0 CELIAC DISEASE: ICD-10-CM

## 2025-01-13 PROCEDURE — 3430000000 HC RX DIAGNOSTIC RADIOPHARMACEUTICAL: Performed by: INTERNAL MEDICINE

## 2025-01-13 PROCEDURE — A9537 TC99M MEBROFENIN: HCPCS | Performed by: INTERNAL MEDICINE

## 2025-01-13 PROCEDURE — 78226 HEPATOBILIARY SYSTEM IMAGING: CPT

## 2025-01-13 RX ORDER — KIT FOR THE PREPARATION OF TECHNETIUM TC 99M MEBROFENIN 45 MG/10ML
5.2 INJECTION, POWDER, LYOPHILIZED, FOR SOLUTION INTRAVENOUS
Status: COMPLETED | OUTPATIENT
Start: 2025-01-13 | End: 2025-01-13

## 2025-01-13 RX ADMIN — KIT FOR THE PREPARATION OF TECHNETIUM TC 99M MEBROFENIN 5.2 MILLICURIE: 45 INJECTION, POWDER, LYOPHILIZED, FOR SOLUTION INTRAVENOUS at 12:45

## 2025-08-27 ENCOUNTER — TRANSCRIBE ORDERS (OUTPATIENT)
Facility: HOSPITAL | Age: 45
End: 2025-08-27

## 2025-08-27 DIAGNOSIS — E55.9 VITAMIN D DEFICIENCY: ICD-10-CM

## 2025-08-27 DIAGNOSIS — K90.0 CELIAC DISEASE: Primary | ICD-10-CM
